# Patient Record
Sex: FEMALE | Race: WHITE | NOT HISPANIC OR LATINO | URBAN - METROPOLITAN AREA
[De-identification: names, ages, dates, MRNs, and addresses within clinical notes are randomized per-mention and may not be internally consistent; named-entity substitution may affect disease eponyms.]

---

## 2024-04-14 ENCOUNTER — OFFICE VISIT (OUTPATIENT)
Dept: URGENT CARE | Facility: CLINIC | Age: 49
End: 2024-04-14
Payer: COMMERCIAL

## 2024-04-14 VITALS
SYSTOLIC BLOOD PRESSURE: 107 MMHG | OXYGEN SATURATION: 96 % | TEMPERATURE: 98 F | HEART RATE: 104 BPM | RESPIRATION RATE: 18 BRPM | DIASTOLIC BLOOD PRESSURE: 74 MMHG

## 2024-04-14 DIAGNOSIS — R30.0 DYSURIA: Primary | ICD-10-CM

## 2024-04-14 PROCEDURE — 99203 OFFICE O/P NEW LOW 30 MIN: CPT | Performed by: PHYSICIAN ASSISTANT

## 2024-04-14 PROCEDURE — 87086 URINE CULTURE/COLONY COUNT: CPT

## 2024-04-14 PROCEDURE — 87186 SC STD MICRODIL/AGAR DIL: CPT

## 2024-04-14 RX ORDER — NITROFURANTOIN 25; 75 MG/1; MG/1
100 CAPSULE ORAL 2 TIMES DAILY
Qty: 14 CAPSULE | Refills: 0 | Status: SHIPPED | OUTPATIENT
Start: 2024-04-14 | End: 2024-04-21

## 2024-04-14 ASSESSMENT — ENCOUNTER SYMPTOMS
NEUROLOGICAL NEGATIVE: 1
ALLERGIC/IMMUNOLOGIC NEGATIVE: 1
FATIGUE: 0
SHORTNESS OF BREATH: 0
ENDOCRINE NEGATIVE: 1
BACK PAIN: 0
HEMATOLOGIC/LYMPHATIC NEGATIVE: 1
PSYCHIATRIC NEGATIVE: 1
FEVER: 0
COLOR CHANGE: 0
VOMITING: 0
FREQUENCY: 1
WOUND: 0
NAUSEA: 0
FLANK PAIN: 0
APPETITE CHANGE: 0
EYES NEGATIVE: 1
ABDOMINAL PAIN: 1
DYSURIA: 1
ARTHRALGIAS: 0
ACTIVITY CHANGE: 0

## 2024-04-14 ASSESSMENT — PAIN SCALES - GENERAL: PAINLEVEL: 4

## 2024-04-14 NOTE — PROGRESS NOTES
Subjective   Patient ID: Dede Cyr is a 49 y.o. female who presents for UTI (Burning w/ urination and frequent urination).  Patient denies any vaginal discharge no nausea vomiting.  She does endorse some subjective fevers and chills last night.  She denies any recorded fevers.  Patient with a recent history of colon resection secondary to colon cancer, chemotherapy.      No past medical history on file.    Review of Systems   Constitutional:  Negative for activity change, appetite change, fatigue and fever.   HENT: Negative.     Eyes: Negative.    Respiratory:  Negative for shortness of breath.    Cardiovascular:  Negative for chest pain.   Gastrointestinal:  Positive for abdominal pain (SP). Negative for nausea and vomiting.   Endocrine: Negative.    Genitourinary:  Positive for dysuria and frequency. Negative for flank pain and vaginal discharge.   Musculoskeletal:  Negative for arthralgias, back pain and gait problem.   Skin:  Negative for color change, rash and wound.   Allergic/Immunologic: Negative.    Neurological: Negative.    Hematological: Negative.    Psychiatric/Behavioral: Negative.         Objective   /74 (BP Location: Left arm, Patient Position: Sitting, BP Cuff Size: Adult long)   Pulse 104   Temp 36.7 °C (98 °F)   Resp 18   LMP 04/14/2024   SpO2 96%   Physical Exam  Constitutional:       General: She is not in acute distress.     Appearance: Normal appearance. She is not ill-appearing, toxic-appearing or diaphoretic.   HENT:      Head: Normocephalic and atraumatic.      Mouth/Throat:      Mouth: Mucous membranes are moist.      Pharynx: Oropharynx is clear.   Eyes:      Conjunctiva/sclera: Conjunctivae normal.   Cardiovascular:      Rate and Rhythm: Normal rate and regular rhythm.      Heart sounds: No murmur heard.  Pulmonary:      Effort: Pulmonary effort is normal. No respiratory distress.      Breath sounds: Normal breath sounds. No wheezing.   Abdominal:      Tenderness:  There is abdominal tenderness (SP). There is no right CVA tenderness, left CVA tenderness, guarding or rebound.   Musculoskeletal:         General: No swelling, tenderness, deformity or signs of injury. Normal range of motion.      Cervical back: Normal range of motion and neck supple.   Skin:     General: Skin is warm and dry.      Findings: No erythema or rash.   Neurological:      General: No focal deficit present.      Mental Status: She is alert and oriented to person, place, and time.      Gait: Gait normal.         Assessment/Plan   Problem List Items Addressed This Visit       Dysuria - Primary    Relevant Medications    nitrofurantoin, macrocrystal-monohydrate, (Macrobid) 100 mg capsule    Other Relevant Orders    Urine culture      Patient is here with dysuria ongoing over the course of the last week  Recently finished chemotherapy secondary to colon cancer  Took Pyridium prior to arrival urinalysis deferred as this would be inconclusive given the Pyridium use  I am sending the urine for culture and given the patient's underlying medical history I am opting to initiate treatment based upon history and physical exam findings.  Starting the patient on Macrobid and recommending increase fluids.  If there is any worsening despite treatment recommending patient proceed to the emergency room

## 2024-04-14 NOTE — PATIENT INSTRUCTIONS
Assessment/Plan   Problem List Items Addressed This Visit       Dysuria - Primary    Relevant Medications    nitrofurantoin, macrocrystal-monohydrate, (Macrobid) 100 mg capsule    Other Relevant Orders    Urine culture      Patient is here with dysuria ongoing over the course of the last week  Recently finished chemotherapy secondary to colon cancer  Took Pyridium prior to arrival urinalysis deferred as this would be inconclusive given the Pyridium use  I am sending the urine for culture and given the patient's underlying medical history I am opting to initiate treatment based upon history and physical exam findings.  Starting the patient on Macrobid and recommending increase fluids.  If there is any worsening despite treatment recommending patient proceed to the emergency room

## 2024-04-17 LAB — BACTERIA UR CULT: ABNORMAL

## 2025-06-01 ENCOUNTER — OFFICE VISIT (OUTPATIENT)
Dept: URGENT CARE | Age: 50
End: 2025-06-01
Payer: COMMERCIAL

## 2025-06-01 VITALS
DIASTOLIC BLOOD PRESSURE: 77 MMHG | OXYGEN SATURATION: 96 % | TEMPERATURE: 98 F | HEART RATE: 77 BPM | SYSTOLIC BLOOD PRESSURE: 114 MMHG | RESPIRATION RATE: 16 BRPM

## 2025-06-01 DIAGNOSIS — Z20.818 STREPTOCOCCUS EXPOSURE: ICD-10-CM

## 2025-06-01 DIAGNOSIS — J02.9 SORE THROAT: ICD-10-CM

## 2025-06-01 DIAGNOSIS — J02.0 STREP PHARYNGITIS: Primary | ICD-10-CM

## 2025-06-01 LAB
POC HUMAN RHINOVIRUS PCR: NEGATIVE
POC INFLUENZA A VIRUS PCR: NEGATIVE
POC INFLUENZA B VIRUS PCR: NEGATIVE
POC RESPIRATORY SYNCYTIAL VIRUS PCR: NEGATIVE
POC STREPTOCOCCUS PYOGENES (GROUP A STREP) PCR: POSITIVE

## 2025-06-01 PROCEDURE — 87651 STREP A DNA AMP PROBE: CPT | Performed by: STUDENT IN AN ORGANIZED HEALTH CARE EDUCATION/TRAINING PROGRAM

## 2025-06-01 PROCEDURE — 99070 SPECIAL SUPPLIES PHYS/QHP: CPT | Performed by: STUDENT IN AN ORGANIZED HEALTH CARE EDUCATION/TRAINING PROGRAM

## 2025-06-01 PROCEDURE — 1036F TOBACCO NON-USER: CPT | Performed by: STUDENT IN AN ORGANIZED HEALTH CARE EDUCATION/TRAINING PROGRAM

## 2025-06-01 PROCEDURE — 87631 RESP VIRUS 3-5 TARGETS: CPT | Performed by: STUDENT IN AN ORGANIZED HEALTH CARE EDUCATION/TRAINING PROGRAM

## 2025-06-01 PROCEDURE — 99203 OFFICE O/P NEW LOW 30 MIN: CPT | Performed by: STUDENT IN AN ORGANIZED HEALTH CARE EDUCATION/TRAINING PROGRAM

## 2025-06-01 RX ORDER — ESTRADIOL 0.05 MG/D
PATCH TRANSDERMAL
COMMUNITY

## 2025-06-01 RX ORDER — AZITHROMYCIN 250 MG/1
TABLET, FILM COATED ORAL
Qty: 6 TABLET | Refills: 0 | Status: SHIPPED | OUTPATIENT
Start: 2025-06-01

## 2025-06-01 NOTE — PROGRESS NOTES
Subjective   Patient ID: Dede Cyr is a 50 y.o. female. They present today with a chief complaint of Cough and Sore Throat.    History of Present Illness  Dede is a 50-year-old pleasant female who presents to the urgent care for evaluation of sore throat after recent exposure to strep positive children in the household.  Patient states she was at a concert and had a cold a few days ago with cough that slowly improved but now has worsening sore throat with known exposure to strep and would like this evaluated, tested and ruled out.  Patient denies chest pain or shortness of breath.  No other symptoms or concerns otherwise reported.  Patient admits to penicillin allergy.    Past Medical History  Allergies as of 06/01/2025 - Reviewed 06/01/2025   Allergen Reaction Noted    Penicillins Rash 10/30/2017       Prescriptions Prior to Admission[1]     Medical History[2]    Surgical History[3]     reports that she has never smoked. She has never used smokeless tobacco.    Review of Systems  A 10-point review of systems was performed, otherwise unremarkable unless stated in the history of present illness.              Objective    Vitals:    06/01/25 1504   BP: 114/77   Pulse: 77   Resp: 16   Temp: 36.7 °C (98 °F)   SpO2: 96%     Patient's last menstrual period was 04/14/2024.    Gen: Vitals noted and reviewed, no evidence of acute distress, well developed and afebrile.   Head/Face: Atraumatic and normocephalic.   ENT: TMs clear bilaterally, EACs unremarkable. Mastoids non-tender. Posterior oropharynx with erythema, with minimal exudates, and 1+ b/l tonsillar swelling. Uvula is in the midline and non-edematous.   Neck: Supple. No meningismus through full range of motion. +b/l superficial anterior cervical lymphadenopathy.   Cardiac: Regular rate and rhythm no murmur.   Lungs: Clear to auscultation throughout, No evidence of wheezing, rhonchi or crackles. Good aeration throughout.   Skin: Without evidence of  ecchymosis, wounds, or rashes.  Psych: Mood and affect appropriate for setting.         Point of Care Test & Imaging Results from this visit  Results for orders placed or performed in visit on 06/01/25   POCT SPOTFIRE R/ST Panel Mini w/Strep A ([facility]) manually resulted   Result Value Ref Range    POC Group A Strep, PCR Positive (A) Negative    POC Respiratory Syncytial Virus PCR Negative Negative    POC Influenza A Virus PCR Negative Negative    POC Influenza B Virus PCR Negative Negative    POC Human Rhinovirus PCR Negative Negative      Imaging  No results found.    Cardiology, Vascular, and Other Imaging  No other imaging results found for the past 2 days      Diagnostic study results (if any) were reviewed by Suzan Shields DO.    Assessment/Plan   Allergies, medications, history, and pertinent labs/EKGs/Imaging reviewed by Suzan Shields DO.     Medical Decision Making  Discussed with the patient symptoms and clinical presentation findings suggestive of acute pharyngitis likely secondary strep infection.  Advise close symptom monitoring supportive treatment use of over-the-counter remedies to aid in symptom management.  Reviewed patient allergies to penicillins and prescribed azithromycin for antimicrobial coverage. Follow up with Primary Care Provider. We advised seeking immediate emergency medical attention if symptoms fail to improve, worsen or any concerning symptoms arise. Patient voiced full understanding and agreement to plan.      Orders and Diagnoses  Diagnoses and all orders for this visit:  Strep pharyngitis  -     azithromycin (Zithromax) 250 mg tablet; Take 2 tabs (500mg) on day one then take 1 tab (250mg) on days two to five  -     Referral to Primary Care; Future  Sore throat  -     POCT SPOTFIRE R/ST Panel Mini w/Strep A ([facility]) manually resulted  Streptococcus exposure  -     azithromycin (Zithromax) 250 mg tablet; Take 2 tabs (500mg) on day one then take 1 tab (250mg) on days two to  five  -     Referral to Primary Care; Future      Medical Admin Record      Patient disposition: Home    Electronically signed by Suzan Shields DO  4:30 PM           [1] (Not in a hospital admission)   [2] No past medical history on file.  [3] No past surgical history on file.